# Patient Record
Sex: FEMALE | Race: ASIAN | Employment: OTHER | URBAN - METROPOLITAN AREA
[De-identification: names, ages, dates, MRNs, and addresses within clinical notes are randomized per-mention and may not be internally consistent; named-entity substitution may affect disease eponyms.]

---

## 2017-02-07 ENCOUNTER — OFFICE VISIT (OUTPATIENT)
Dept: FAMILY MEDICINE | Facility: CLINIC | Age: 27
End: 2017-02-07
Payer: COMMERCIAL

## 2017-02-07 VITALS
BODY MASS INDEX: 26.88 KG/M2 | TEMPERATURE: 98.5 F | HEIGHT: 61 IN | SYSTOLIC BLOOD PRESSURE: 100 MMHG | OXYGEN SATURATION: 99 % | HEART RATE: 76 BPM | DIASTOLIC BLOOD PRESSURE: 71 MMHG | WEIGHT: 142.4 LBS

## 2017-02-07 DIAGNOSIS — Z12.4 SCREENING FOR MALIGNANT NEOPLASM OF CERVIX: ICD-10-CM

## 2017-02-07 DIAGNOSIS — E03.9 HYPOTHYROIDISM, UNSPECIFIED TYPE: Chronic | ICD-10-CM

## 2017-02-07 DIAGNOSIS — Z00.00 PREVENTATIVE HEALTH CARE: Primary | ICD-10-CM

## 2017-02-07 DIAGNOSIS — E28.2 PCOS (POLYCYSTIC OVARIAN SYNDROME): Chronic | ICD-10-CM

## 2017-02-07 PROCEDURE — G0145 SCR C/V CYTO,THINLAYER,RESCR: HCPCS | Performed by: INTERNAL MEDICINE

## 2017-02-07 PROCEDURE — 99395 PREV VISIT EST AGE 18-39: CPT | Performed by: INTERNAL MEDICINE

## 2017-02-07 RX ORDER — DICLOFENAC SODIUM 100 MG/1
100 TABLET, FILM COATED, EXTENDED RELEASE ORAL DAILY PRN
COMMUNITY
End: 2019-04-11

## 2017-02-07 NOTE — NURSING NOTE
"Chief Complaint   Patient presents with     Physical       Initial /71 mmHg  Pulse 76  Temp(Src) 98.5  F (36.9  C) (Oral)  Ht 5' 1\" (1.549 m)  Wt 142 lb 6.4 oz (64.592 kg)  BMI 26.92 kg/m2  SpO2 99%  LMP 01/24/2017 (Exact Date) Estimated body mass index is 26.92 kg/(m^2) as calculated from the following:    Height as of this encounter: 5' 1\" (1.549 m).    Weight as of this encounter: 142 lb 6.4 oz (64.592 kg).  Medication Reconciliation: complete, left arm, regular cuff  Hannah Hannah MA  "

## 2017-02-07 NOTE — PROGRESS NOTES
SUBJECTIVE:     CC: Charlotte Antonio is an 26 year old woman who presents for preventive health visit.     Healthy Habits:    Do you get at least three servings of calcium containing foods daily (dairy, green leafy vegetables, etc.)? yes    Amount of exercise or daily activities, outside of work: walking when weather permits    Problems taking medications regularly No    Medication side effects: No    Have you had an eye exam in the past two years? Yes     Do you see a dentist twice per year? No, once every 2 years    Do you have sleep apnea, excessive snoring or daytime drowsiness? No     Infertility Clinic in [place] - plan on insemination on Friday  Already on Clomid - some hot flashes and acne but she says she is willing to work with that if it means possible pregnancy  No h/o abnormal pap smear  No breast changes  No bowel/bladder changes  Periods regular every 30 days; does take NSAID prn for painful periods with PCOS  She had fasting labs per outside source last summer as follows: Glucose 96, , HDL 37, TChol 167,     Today's PHQ-2 Score:   PHQ-2 ( 1999 [employer]) 2/7/2017 12/26/2014   Q1: Little interest or pleasure in doing things 0 0   Q2: Feeling down, depressed or hopeless 0 0   PHQ-2 Score 0 0       Abuse: Current or Past(Physical, Sexual or Emotional)- No  Do you feel safe in your environment - Yes    Social History   Substance Use Topics     Smoking status: Never Smoker      Smokeless tobacco: Not on file     Alcohol Use: No     The patient does not drink >3 drinks per day nor >7 drinks per week.    Recent Labs   Lab Test  12/19/14   0840   CHOL  169   HDL  41*   LDL  104   TRIG  121   CHOLHDLRATIO  4.1       Reviewed orders with patient.  Reviewed health maintenance and updated orders accordingly - Yes    Mammo Decision Support:  Mammogram not appropriate for this patient based on age.  Pertinent mammograms are reviewed under the imaging tab.  History of abnormal Pap smear: NO - age  "21-29 PAP every 3 years recommended.    All Histories reviewed and updated in Epic.    ROS:  Comprehensive ROS negative unless as stated above in HPI.     OBJECTIVE:     /71 mmHg  Pulse 76  Temp(Src) 98.5  F (36.9  C) (Oral)  Ht 5' 1\" (1.549 m)  Wt 142 lb 6.4 oz (64.592 kg)  BMI 26.92 kg/m2  SpO2 99%  LMP 01/24/2017 (Exact Date)  EXAM:  GENERAL: healthy, alert and no distress  EYES: Eyes grossly normal to inspection, PERRL and conjunctivae and sclerae normal  HENT: ear canals and TM's normal, nose and mouth without ulcers or lesions  NECK: no adenopathy, no asymmetry, masses, or scars and thyroid normal to palpation  RESP: lungs clear to auscultation - no rales, rhonchi or wheezes  BREAST: normal without masses, tenderness or nipple discharge and no palpable axillary masses or adenopathy  CV: regular rate and rhythm, normal S1 S2, no S3 or S4, no murmur, click or rub, no peripheral edema and peripheral pulses strong  ABDOMEN: soft, nontender, no hepatosplenomegaly, no masses and bowel sounds normal   (female): normal female external genitalia, normal urethral meatus, vaginal mucosa pink, moist, well rugated, and normal cervix/adnexa/uterus without masses or discharge  MS: no gross musculoskeletal defects noted, no edema  SKIN: no suspicious lesions or rashes  NEURO: Normal strength and tone, mentation intact and speech normal  PSYCH: mentation appears normal, affect normal/bright    ASSESSMENT/PLAN:     1. Preventative health care  Healthy gal - exercise more as weather permits  She declined flu and Tdap shots today  She good labs as noted above in HPI    2. Screening for malignant neoplasm of cervix  No h/o abnormal pap  - Pap imaged thin layer screen reflex to HPV if ASCUS - recommend age 25 - 29    3. PCOS (polycystic ovarian syndrome)  Per OBGYN/Infertility Clinic    4. Hypothyroidism, unspecified type  Increased dose from 25 mcg to 50 mcg as she tries to conceive - this again is managed by " "Infertility Clinic       COUNSELING:   Reviewed preventive health counseling, as reflected in patient instructions       Regular exercise       Healthy diet/nutrition   reports that she has never smoked. She does not have any smokeless tobacco history on file.  Estimated body mass index is 26.92 kg/(m^2) as calculated from the following:    Height as of this encounter: 5' 1\" (1.549 m).    Weight as of this encounter: 142 lb 6.4 oz (64.592 kg).   Weight management plan: Discussed healthy diet and exercise guidelines and patient will follow up in 12 months in clinic to re-evaluate.    Patient Instructions   Pap today - results will come to you on MyChart  Good luck to you!  Follow up with me as needed.        Jayshree Flowers, DO  [facility]  "

## 2017-02-07 NOTE — MR AVS SNAPSHOT
After Visit Summary   2/7/2017    Charlotte Antonio    MRN: [id number]           Patient Information     Date Of Birth          1990        Visit Information        Provider Department      2/7/2017 5:30 PM Jayshree Flowers, DO [facility]        Today's Diagnoses     Screening for malignant neoplasm of cervix    -  1       Care Instructions    Pap today - results will come to you on MyChart  Good luck to you!  Follow up with me as needed.    Preventive Health Recommendations  Female Ages 26 - 39  Yearly exam:   See your health care provider every year in order to    Review health changes.     Discuss preventive care.      Review your medicines if you your doctor has prescribed any.    Until age 30: Get a Pap test every three years (more often if you have had an abnormal result).    After age 30: Talk to your doctor about whether you should have a Pap test every 3 years or have a Pap test with HPV screening every 5 years.   You do not need a Pap test if your uterus was removed (hysterectomy) and you have not had cancer.  You should be tested each year for STDs (sexually transmitted diseases), if you're at risk.   Talk to your provider about how often to have your cholesterol checked.  If you are at risk for diabetes, you should have a diabetes test (fasting glucose).  Shots: Get a flu shot each year. Get a tetanus shot every 10 years.   Nutrition:     Eat at least 5 servings of fruits and vegetables each day.    Eat whole-grain bread, whole-wheat pasta and brown rice instead of white grains and rice.    Talk to your provider about Calcium and Vitamin D.     Lifestyle    Exercise at least 150 minutes a week (30 minutes a day, 5 days of the week). This will help you control your weight and prevent disease.    Limit alcohol to one drink per day.    No smoking.     Wear sunscreen to prevent skin cancer.    See your dentist every six months for an exam and cleaning.        Follow-ups  "after your visit        Who to contact     If you have questions or need follow up information about today's clinic visit or your schedule please contact [facility] directly at [phone].  Normal or non-critical lab and imaging results will be communicated to you by MyChart, letter or phone within 4 business days after the clinic has received the results. If you do not hear from us within 7 days, please contact the clinic through MyChart or phone. If you have a critical or abnormal lab result, we will notify you by phone as soon as possible.  Submit refill requests through [employer] or call your pharmacy and they will forward the refill request to us. Please allow 3 business days for your refill to be completed.          Additional Information About Your Visit        [employer] Information     [employer] gives you secure access to your electronic health record. If you see a primary care provider, you can also send messages to your care team and make appointments. If you have questions, please call your primary care clinic.  If you do not have a primary care provider, please call [phone] and they will assist you.        Care EveryWhere ID     This is your Care EveryWhere ID. This could be used by other organizations to access your [facility] medical records  [id number]        Your Vitals Were     Pulse Temperature Height BMI (Body Mass Index) Pulse Oximetry Last Period    76 98.5  F (36.9  C) (Oral) 5' 1\" (1.549 m) 26.92 kg/m2 99% 01/24/2017 (Exact Date)       Blood Pressure from Last 3 Encounters:   02/07/17 100/71   08/05/15 94/66   12/26/14 106/65    Weight from Last 3 Encounters:   02/07/17 142 lb 6.4 oz (64.592 kg)   08/05/15 138 lb 14.4 oz (63.005 kg)   12/26/14 141 lb 9.6 oz (64.229 kg)              We Performed the Following     Pap imaged thin layer screen reflex to HPV if ASCUS - recommend age 25 - 29          Today's Medication Changes          These changes are accurate as of: 2/7/17  6:18 PM. "  If you have any questions, ask your nurse or doctor.               These medicines have changed or have updated prescriptions.        Dose/Directions    LEVOTHYROXINE SODIUM PO   This may have changed:  Another medication with the same name was removed. Continue taking this medication, and follow the directions you see here.   Changed by:  Jayshree Flowers DO        Dose:  50 mcg   Take 50 mcg by mouth daily   Refills:  0                Primary Care Provider Office Phone # Fax #    Jayshree Flowers [phone]       [facility] [address] [place] [address] [address]  [facility] [id number]        Thank you!     Thank you for choosing [facility]  for your care. Our goal is always to provide you with excellent care. Hearing back from our patients is one way we can continue to improve our services. Please take a few minutes to complete the written survey that you may receive in the mail after your visit with us. Thank you!             Your Updated Medication List - Protect others around you: Learn how to safely use, store and throw away your medicines at www.disposemymeds.org.          This list is accurate as of: 2/7/17  6:18 PM.  Always use your most recent med list.                   Brand Name Dispense Instructions for use    ADVIL 200 MG tablet   Generic drug:  ibuprofen      Take 200 mg by mouth every 4 hours as needed for mild pain       CLOMID PO          diclofenac 100 MG 24 hr tablet    VOLTAREN XR     Take 100 mg by mouth daily as needed (Painful periods)       LEVOTHYROXINE SODIUM PO      Take 50 mcg by mouth daily       MULTIVITAMIN ADULT PO      Take by mouth daily       TYLENOL PO

## 2017-02-07 NOTE — PATIENT INSTRUCTIONS
Pap today - results will come to you on James  Good luck to you!  Follow up with me as needed.    Preventive Health Recommendations  Female Ages 26 - 39  Yearly exam:   See your health care provider every year in order to    Review health changes.     Discuss preventive care.      Review your medicines if you your doctor has prescribed any.    Until age 30: Get a Pap test every three years (more often if you have had an abnormal result).    After age 30: Talk to your doctor about whether you should have a Pap test every 3 years or have a Pap test with HPV screening every 5 years.   You do not need a Pap test if your uterus was removed (hysterectomy) and you have not had cancer.  You should be tested each year for STDs (sexually transmitted diseases), if you're at risk.   Talk to your provider about how often to have your cholesterol checked.  If you are at risk for diabetes, you should have a diabetes test (fasting glucose).  Shots: Get a flu shot each year. Get a tetanus shot every 10 years.   Nutrition:     Eat at least 5 servings of fruits and vegetables each day.    Eat whole-grain bread, whole-wheat pasta and brown rice instead of white grains and rice.    Talk to your provider about Calcium and Vitamin D.     Lifestyle    Exercise at least 150 minutes a week (30 minutes a day, 5 days of the week). This will help you control your weight and prevent disease.    Limit alcohol to one drink per day.    No smoking.     Wear sunscreen to prevent skin cancer.    See your dentist every six months for an exam and cleaning.

## 2017-02-10 LAB
COPATH REPORT: NORMAL
PAP: NORMAL

## 2017-10-16 ENCOUNTER — TELEPHONE (OUTPATIENT)
Dept: FAMILY MEDICINE | Facility: CLINIC | Age: 27
End: 2017-10-16

## 2017-10-16 DIAGNOSIS — Z13.220 LIPID SCREENING: Primary | ICD-10-CM

## 2017-10-16 NOTE — TELEPHONE ENCOUNTER
Pt requesting orders for cholesterol be placed.  Declined appt with  or nurse.  She can be reached at [phone]

## 2017-10-19 DIAGNOSIS — Z13.220 LIPID SCREENING: ICD-10-CM

## 2017-10-19 LAB
CHOLEST SERPL-MCNC: 158 MG/DL
GLUCOSE SERPL-MCNC: 89 MG/DL (ref 70–99)
HDLC SERPL-MCNC: 45 MG/DL
LDLC SERPL CALC-MCNC: 93 MG/DL
NONHDLC SERPL-MCNC: 113 MG/DL
TRIGL SERPL-MCNC: 99 MG/DL

## 2017-10-19 PROCEDURE — 80061 LIPID PANEL: CPT | Performed by: INTERNAL MEDICINE

## 2017-10-19 PROCEDURE — 36415 COLL VENOUS BLD VENIPUNCTURE: CPT | Performed by: INTERNAL MEDICINE

## 2017-10-19 PROCEDURE — 82947 ASSAY GLUCOSE BLOOD QUANT: CPT | Performed by: INTERNAL MEDICINE

## 2018-12-24 LAB
ABO + RH BLD: NORMAL
ABO + RH BLD: NORMAL
BLD GP AB SCN SERPL QL: NEGATIVE
HBV SURFACE AG SERPL QL IA: NORMAL
RUBELLA ANTIBODY IGG QUANTITATIVE: NORMAL IU/ML

## 2019-01-17 ENCOUNTER — TRANSFERRED RECORDS (OUTPATIENT)
Dept: HEALTH INFORMATION MANAGEMENT | Facility: CLINIC | Age: 29
End: 2019-01-17

## 2019-03-01 ENCOUNTER — TRANSFERRED RECORDS (OUTPATIENT)
Dept: MULTI SPECIALTY CLINIC | Facility: CLINIC | Age: 29
End: 2019-03-01

## 2019-03-01 LAB — PAP SMEAR - HIM PATIENT REPORTED: NEGATIVE

## 2019-04-11 ENCOUNTER — OFFICE VISIT (OUTPATIENT)
Dept: FAMILY MEDICINE | Facility: CLINIC | Age: 29
End: 2019-04-11
Payer: COMMERCIAL

## 2019-04-11 VITALS
DIASTOLIC BLOOD PRESSURE: 58 MMHG | HEIGHT: 61 IN | BODY MASS INDEX: 31.53 KG/M2 | HEART RATE: 84 BPM | SYSTOLIC BLOOD PRESSURE: 96 MMHG | TEMPERATURE: 98.3 F | WEIGHT: 167 LBS | OXYGEN SATURATION: 99 %

## 2019-04-11 DIAGNOSIS — Z33.1 PREGNANT STATE, INCIDENTAL: ICD-10-CM

## 2019-04-11 DIAGNOSIS — R07.89 ATYPICAL CHEST PAIN: Primary | ICD-10-CM

## 2019-04-11 PROCEDURE — 99213 OFFICE O/P EST LOW 20 MIN: CPT | Performed by: NURSE PRACTITIONER

## 2019-04-11 RX ORDER — LEVOTHYROXINE SODIUM 25 UG/1
25 TABLET ORAL DAILY
Refills: 3 | COMMUNITY
Start: 2019-02-15 | End: 2019-08-24

## 2019-04-11 ASSESSMENT — MIFFLIN-ST. JEOR: SCORE: 1419.89

## 2019-04-11 NOTE — PROGRESS NOTES
HPI      SUBJECTIVE:   Charlotte Antonio is a 28 year old female who presents to clinic today for the following   health issues:      Chief Complaint   Patient presents with     Musculoskeletal Problem     right arm, right side of chest since this morning       Is 23 weeks pregnant with her first   This morning noticed a pain of right chest and right hand. It was there for 30 mins and now is a lot better   It happened right after getting out of bed. Was sleeping on right side last night but did flip to left   Last night had deep fried spicy food and had bloating from this.   Had hot water with honey and lemon and also almonds and symptoms improved after this.   Massaging the arm and raising the arm was comforting   No recent URI   No cough, SOB,  Has had similar CP with bloating in the past   Has had more burping with the pregnancy   No recent injury   No rashes   No breast pain   No neck pain   No pleuritic pain   No abd pain or bleeding    Past Medical History:   Diagnosis Date     Chondromalacia patellae of right knee      Hypothyroidism      PCOS (polycystic ovarian syndrome)      Family History   Problem Relation Age of Onset     C.A.D. No family hx of      Diabetes Mother      Cancer No family hx of      Past Surgical History:   Procedure Laterality Date     NO HISTORY OF SURGERY       Social History     Tobacco Use     Smoking status: Never Smoker     Smokeless tobacco: Never Used   Substance Use Topics     Alcohol use: No     Alcohol/week: 0.0 oz     Current Outpatient Medications   Medication Sig Dispense Refill     levothyroxine (SYNTHROID/LEVOTHROID) 25 MCG tablet Take 25 mcg by mouth daily  3     Multiple Vitamins-Minerals (MULTIVITAMIN ADULT PO) Take by mouth daily       Allergies   Allergen Reactions     Sulfamethoxazole Rash       Reviewed and updated as needed this visit by clinical staff and provider       ROS  Detailed as above       BP 96/58 (BP Location: Left arm, Patient Position: Sitting,  "Cuff Size: Adult Regular)   Pulse 84   Temp 98.3  F (36.8  C) (Oral)   Ht 1.549 m (5' 1\")   Wt 75.8 kg (167 lb)   LMP 10/23/2018 (Exact Date)   SpO2 99%   Breastfeeding? No   BMI 31.55 kg/m     Physical Exam   Constitutional: She appears well-developed.   HENT:   Head: Normocephalic.   Eyes: Conjunctivae are normal.   Cardiovascular: Normal rate, regular rhythm, normal heart sounds and intact distal pulses.   No murmur heard.  Pulmonary/Chest: Effort normal and breath sounds normal. No respiratory distress. She exhibits no tenderness.   Abdominal:   Gravid abdomen   Musculoskeletal: Normal range of motion. She exhibits no edema.   Neurological: She is alert.   Skin: Skin is warm and dry.   Psychiatric: She has a normal mood and affect. Judgment normal.       Assessment and Plan:       ICD-10-CM    1. Atypical chest pain R07.89    2. Pregnant state, incidental Z33.1      Suspect pain is muscular vs indigestion. No red flags today. Will continue to watch and wait. She has OB appt tomorrow and can discuss at that time.       Guillermo Boucher, APRN, CNP  [facility]    "

## 2019-04-12 LAB — TREPONEMA ANTIBODIES: NORMAL

## 2019-07-03 LAB — GROUP B STREP PCR: NEGATIVE

## 2019-07-18 ENCOUNTER — ANESTHESIA (OUTPATIENT)
Dept: SURGERY | Facility: CLINIC | Age: 29
End: 2019-07-18
Payer: COMMERCIAL

## 2019-07-18 ENCOUNTER — HOSPITAL ENCOUNTER (INPATIENT)
Facility: CLINIC | Age: 29
LOS: 4 days | Discharge: HOME | End: 2019-07-22
Attending: OBSTETRICS & GYNECOLOGY | Admitting: OBSTETRICS & GYNECOLOGY
Payer: COMMERCIAL

## 2019-07-18 ENCOUNTER — ANESTHESIA EVENT (OUTPATIENT)
Dept: SURGERY | Facility: CLINIC | Age: 29
End: 2019-07-18
Payer: COMMERCIAL

## 2019-07-18 DIAGNOSIS — Z98.891 S/P PRIMARY LOW TRANSVERSE C-SECTION: Primary | ICD-10-CM

## 2019-07-18 LAB
ABO + RH BLD: NORMAL
ABO + RH BLD: NORMAL
BLD GP AB SCN SERPL QL: NORMAL
BLD PROD TYP BPU: NORMAL
BLD UNIT ID BPU: 0
BLD UNIT ID BPU: 0
BLOOD BANK CMNT PATIENT-IMP: NORMAL
BLOOD PRODUCT CODE: NORMAL
BLOOD PRODUCT CODE: NORMAL
BPU ID: NORMAL
BPU ID: NORMAL
HGB BLD-MCNC: 13.4 G/DL (ref 11.7–15.7)
NUM BPU REQUESTED: 2
SPECIMEN EXP DATE BLD: NORMAL
TRANSFUSION STATUS PATIENT QL: NORMAL

## 2019-07-18 PROCEDURE — 37000008 ZZH ANESTHESIA TECHNICAL FEE, 1ST 30 MIN: Performed by: OBSTETRICS & GYNECOLOGY

## 2019-07-18 PROCEDURE — 86850 RBC ANTIBODY SCREEN: CPT | Performed by: OBSTETRICS & GYNECOLOGY

## 2019-07-18 PROCEDURE — 25000132 ZZH RX MED GY IP 250 OP 250 PS 637

## 2019-07-18 PROCEDURE — 25000128 H RX IP 250 OP 636

## 2019-07-18 PROCEDURE — 25000128 H RX IP 250 OP 636: Performed by: OBSTETRICS & GYNECOLOGY

## 2019-07-18 PROCEDURE — 25000132 ZZH RX MED GY IP 250 OP 250 PS 637: Performed by: OBSTETRICS & GYNECOLOGY

## 2019-07-18 PROCEDURE — 36000056 ZZH SURGERY LEVEL 3 1ST 30 MIN: Performed by: OBSTETRICS & GYNECOLOGY

## 2019-07-18 PROCEDURE — 25800030 ZZH RX IP 258 OP 636: Performed by: NURSE ANESTHETIST, CERTIFIED REGISTERED

## 2019-07-18 PROCEDURE — 86901 BLOOD TYPING SEROLOGIC RH(D): CPT | Performed by: OBSTETRICS & GYNECOLOGY

## 2019-07-18 PROCEDURE — 36000058 ZZH SURGERY LEVEL 3 EA 15 ADDTL MIN: Performed by: OBSTETRICS & GYNECOLOGY

## 2019-07-18 PROCEDURE — 85018 HEMOGLOBIN: CPT | Performed by: OBSTETRICS & GYNECOLOGY

## 2019-07-18 PROCEDURE — 25800030 ZZH RX IP 258 OP 636: Performed by: OBSTETRICS & GYNECOLOGY

## 2019-07-18 PROCEDURE — 86891 AUTOLOGOUS BLOOD OP SALVAGE: CPT | Performed by: OBSTETRICS & GYNECOLOGY

## 2019-07-18 PROCEDURE — 86923 COMPATIBILITY TEST ELECTRIC: CPT | Performed by: OBSTETRICS & GYNECOLOGY

## 2019-07-18 PROCEDURE — 37000009 ZZH ANESTHESIA TECHNICAL FEE, EACH ADDTL 15 MIN: Performed by: OBSTETRICS & GYNECOLOGY

## 2019-07-18 PROCEDURE — 27211220 ZZ H OR ASSEMBLY BASIC A&A LINE 00208-00: Performed by: OBSTETRICS & GYNECOLOGY

## 2019-07-18 PROCEDURE — 86900 BLOOD TYPING SEROLOGIC ABO: CPT | Performed by: OBSTETRICS & GYNECOLOGY

## 2019-07-18 PROCEDURE — 12000035 ZZH R&B POSTPARTUM

## 2019-07-18 PROCEDURE — 71000012 ZZH RECOVERY PHASE 1 LEVEL 1 FIRST HR: Performed by: OBSTETRICS & GYNECOLOGY

## 2019-07-18 PROCEDURE — 86780 TREPONEMA PALLIDUM: CPT | Performed by: OBSTETRICS & GYNECOLOGY

## 2019-07-18 PROCEDURE — 25000125 ZZHC RX 250: Performed by: NURSE ANESTHETIST, CERTIFIED REGISTERED

## 2019-07-18 PROCEDURE — 25000128 H RX IP 250 OP 636: Performed by: NURSE ANESTHETIST, CERTIFIED REGISTERED

## 2019-07-18 PROCEDURE — 71000013 ZZH RECOVERY PHASE 1 LEVEL 1 EA ADDTL HR: Performed by: OBSTETRICS & GYNECOLOGY

## 2019-07-18 PROCEDURE — 27210794 ZZH OR GENERAL SUPPLY STERILE: Performed by: OBSTETRICS & GYNECOLOGY

## 2019-07-18 PROCEDURE — 40000169 ZZH STATISTIC PRE-PROCEDURE ASSESSMENT I: Performed by: OBSTETRICS & GYNECOLOGY

## 2019-07-18 PROCEDURE — 36415 COLL VENOUS BLD VENIPUNCTURE: CPT | Performed by: OBSTETRICS & GYNECOLOGY

## 2019-07-18 PROCEDURE — P9016 RBC LEUKOCYTES REDUCED: HCPCS | Performed by: OBSTETRICS & GYNECOLOGY

## 2019-07-18 PROCEDURE — 27211219 ZZ H OR RESEVOIR 3L 150 MICRON FILTER CELLSAVER HARDSHELL 00205-00: Performed by: OBSTETRICS & GYNECOLOGY

## 2019-07-18 RX ORDER — ONDANSETRON 2 MG/ML
4 INJECTION INTRAMUSCULAR; INTRAVENOUS EVERY 6 HOURS PRN
Status: DISCONTINUED | OUTPATIENT
Start: 2019-07-18 | End: 2019-07-18

## 2019-07-18 RX ORDER — OXYTOCIN/0.9 % SODIUM CHLORIDE 30/500 ML
340 PLASTIC BAG, INJECTION (ML) INTRAVENOUS CONTINUOUS PRN
Status: DISCONTINUED | OUTPATIENT
Start: 2019-07-18 | End: 2019-07-22 | Stop reason: HOSPADM

## 2019-07-18 RX ORDER — CITRIC ACID/SODIUM CITRATE 334-500MG
30 SOLUTION, ORAL ORAL
Status: COMPLETED | OUTPATIENT
Start: 2019-07-18 | End: 2019-07-18

## 2019-07-18 RX ORDER — HYDROMORPHONE HYDROCHLORIDE 1 MG/ML
.3-.5 INJECTION, SOLUTION INTRAMUSCULAR; INTRAVENOUS; SUBCUTANEOUS EVERY 30 MIN PRN
Status: DISCONTINUED | OUTPATIENT
Start: 2019-07-18 | End: 2019-07-22 | Stop reason: HOSPADM

## 2019-07-18 RX ORDER — MORPHINE SULFATE 1 MG/ML
INJECTION, SOLUTION EPIDURAL; INTRATHECAL; INTRAVENOUS PRN
Status: DISCONTINUED | OUTPATIENT
Start: 2019-07-18 | End: 2019-07-18

## 2019-07-18 RX ORDER — CEFAZOLIN SODIUM 1 G/3ML
1 INJECTION, POWDER, FOR SOLUTION INTRAMUSCULAR; INTRAVENOUS SEE ADMIN INSTRUCTIONS
Status: DISCONTINUED | OUTPATIENT
Start: 2019-07-18 | End: 2019-07-18 | Stop reason: HOSPADM

## 2019-07-18 RX ORDER — SODIUM CHLORIDE, SODIUM LACTATE, POTASSIUM CHLORIDE, CALCIUM CHLORIDE 600; 310; 30; 20 MG/100ML; MG/100ML; MG/100ML; MG/100ML
INJECTION, SOLUTION INTRAVENOUS CONTINUOUS
Status: DISCONTINUED | OUTPATIENT
Start: 2019-07-18 | End: 2019-07-18 | Stop reason: HOSPADM

## 2019-07-18 RX ORDER — ACETAMINOPHEN 325 MG/1
TABLET ORAL
Status: COMPLETED
Start: 2019-07-18 | End: 2019-07-18

## 2019-07-18 RX ORDER — CEFAZOLIN SODIUM 2 G/100ML
INJECTION, SOLUTION INTRAVENOUS
Status: DISCONTINUED
Start: 2019-07-18 | End: 2019-07-18 | Stop reason: HOSPADM

## 2019-07-18 RX ORDER — BISACODYL 10 MG
10 SUPPOSITORY, RECTAL RECTAL DAILY PRN
Status: DISCONTINUED | OUTPATIENT
Start: 2019-07-20 | End: 2019-07-22 | Stop reason: HOSPADM

## 2019-07-18 RX ORDER — LIDOCAINE 40 MG/G
CREAM TOPICAL
Status: DISCONTINUED | OUTPATIENT
Start: 2019-07-18 | End: 2019-07-22 | Stop reason: HOSPADM

## 2019-07-18 RX ORDER — CITRIC ACID/SODIUM CITRATE 334-500MG
SOLUTION, ORAL ORAL
Status: COMPLETED
Start: 2019-07-18 | End: 2019-07-18

## 2019-07-18 RX ORDER — ACETAMINOPHEN 325 MG/1
975 TABLET ORAL EVERY 8 HOURS
Status: COMPLETED | OUTPATIENT
Start: 2019-07-18 | End: 2019-07-21

## 2019-07-18 RX ORDER — OXYTOCIN/0.9 % SODIUM CHLORIDE 30/500 ML
PLASTIC BAG, INJECTION (ML) INTRAVENOUS CONTINUOUS PRN
Status: DISCONTINUED | OUTPATIENT
Start: 2019-07-18 | End: 2019-07-18

## 2019-07-18 RX ORDER — ACETAMINOPHEN 325 MG/1
650 TABLET ORAL EVERY 4 HOURS PRN
Status: DISCONTINUED | OUTPATIENT
Start: 2019-07-21 | End: 2019-07-22 | Stop reason: HOSPADM

## 2019-07-18 RX ORDER — CEFAZOLIN SODIUM 2 G/100ML
INJECTION, SOLUTION INTRAVENOUS PRN
Status: DISCONTINUED | OUTPATIENT
Start: 2019-07-18 | End: 2019-07-18

## 2019-07-18 RX ORDER — CHOLECALCIFEROL (VITAMIN D3) 50 MCG
1 TABLET ORAL DAILY
COMMUNITY

## 2019-07-18 RX ORDER — BUPIVACAINE HYDROCHLORIDE 7.5 MG/ML
INJECTION, SOLUTION INTRASPINAL PRN
Status: DISCONTINUED | OUTPATIENT
Start: 2019-07-18 | End: 2019-07-18

## 2019-07-18 RX ORDER — HYDROCORTISONE 2.5 %
CREAM (GRAM) TOPICAL 3 TIMES DAILY PRN
Status: DISCONTINUED | OUTPATIENT
Start: 2019-07-18 | End: 2019-07-22 | Stop reason: HOSPADM

## 2019-07-18 RX ORDER — OXYTOCIN/0.9 % SODIUM CHLORIDE 30/500 ML
PLASTIC BAG, INJECTION (ML) INTRAVENOUS
Status: DISCONTINUED
Start: 2019-07-18 | End: 2019-07-18 | Stop reason: HOSPADM

## 2019-07-18 RX ORDER — IBUPROFEN 400 MG/1
800 TABLET, FILM COATED ORAL EVERY 6 HOURS PRN
Status: DISCONTINUED | OUTPATIENT
Start: 2019-07-18 | End: 2019-07-22 | Stop reason: HOSPADM

## 2019-07-18 RX ORDER — CEFAZOLIN SODIUM 2 G/100ML
2 INJECTION, SOLUTION INTRAVENOUS
Status: DISCONTINUED | OUTPATIENT
Start: 2019-07-18 | End: 2019-07-18 | Stop reason: HOSPADM

## 2019-07-18 RX ORDER — AMOXICILLIN 250 MG
1 CAPSULE ORAL 2 TIMES DAILY PRN
Status: DISCONTINUED | OUTPATIENT
Start: 2019-07-18 | End: 2019-07-22 | Stop reason: HOSPADM

## 2019-07-18 RX ORDER — LANOLIN 100 %
OINTMENT (GRAM) TOPICAL
Status: DISCONTINUED | OUTPATIENT
Start: 2019-07-18 | End: 2019-07-22 | Stop reason: HOSPADM

## 2019-07-18 RX ORDER — LEVOTHYROXINE SODIUM 25 UG/1
25 TABLET ORAL DAILY
Status: DISCONTINUED | OUTPATIENT
Start: 2019-07-18 | End: 2019-07-22 | Stop reason: HOSPADM

## 2019-07-18 RX ORDER — EPHEDRINE SULFATE 50 MG/ML
5 INJECTION, SOLUTION INTRAMUSCULAR; INTRAVENOUS; SUBCUTANEOUS
Status: DISCONTINUED | OUTPATIENT
Start: 2019-07-18 | End: 2019-07-22 | Stop reason: HOSPADM

## 2019-07-18 RX ORDER — ONDANSETRON 2 MG/ML
INJECTION INTRAMUSCULAR; INTRAVENOUS PRN
Status: DISCONTINUED | OUTPATIENT
Start: 2019-07-18 | End: 2019-07-18

## 2019-07-18 RX ORDER — LIDOCAINE 40 MG/G
CREAM TOPICAL
Status: DISCONTINUED | OUTPATIENT
Start: 2019-07-18 | End: 2019-07-18

## 2019-07-18 RX ORDER — OXYTOCIN/0.9 % SODIUM CHLORIDE 30/500 ML
100 PLASTIC BAG, INJECTION (ML) INTRAVENOUS CONTINUOUS
Status: DISCONTINUED | OUTPATIENT
Start: 2019-07-18 | End: 2019-07-22 | Stop reason: HOSPADM

## 2019-07-18 RX ORDER — DEXTROSE, SODIUM CHLORIDE, SODIUM LACTATE, POTASSIUM CHLORIDE, AND CALCIUM CHLORIDE 5; .6; .31; .03; .02 G/100ML; G/100ML; G/100ML; G/100ML; G/100ML
INJECTION, SOLUTION INTRAVENOUS CONTINUOUS
Status: DISCONTINUED | OUTPATIENT
Start: 2019-07-18 | End: 2019-07-22 | Stop reason: HOSPADM

## 2019-07-18 RX ORDER — SODIUM CHLORIDE, SODIUM LACTATE, POTASSIUM CHLORIDE, CALCIUM CHLORIDE 600; 310; 30; 20 MG/100ML; MG/100ML; MG/100ML; MG/100ML
INJECTION, SOLUTION INTRAVENOUS CONTINUOUS PRN
Status: DISCONTINUED | OUTPATIENT
Start: 2019-07-18 | End: 2019-07-18

## 2019-07-18 RX ORDER — ONDANSETRON 2 MG/ML
4 INJECTION INTRAMUSCULAR; INTRAVENOUS EVERY 6 HOURS PRN
Status: DISCONTINUED | OUTPATIENT
Start: 2019-07-18 | End: 2019-07-22 | Stop reason: HOSPADM

## 2019-07-18 RX ORDER — OXYCODONE HYDROCHLORIDE 5 MG/1
5-10 TABLET ORAL
Status: DISCONTINUED | OUTPATIENT
Start: 2019-07-18 | End: 2019-07-22 | Stop reason: HOSPADM

## 2019-07-18 RX ORDER — KETOROLAC TROMETHAMINE 30 MG/ML
INJECTION, SOLUTION INTRAMUSCULAR; INTRAVENOUS
Status: COMPLETED
Start: 2019-07-18 | End: 2019-07-18

## 2019-07-18 RX ORDER — CITRIC ACID/SODIUM CITRATE 334-500MG
30 SOLUTION, ORAL ORAL
Status: DISCONTINUED | OUTPATIENT
Start: 2019-07-18 | End: 2019-07-18 | Stop reason: HOSPADM

## 2019-07-18 RX ORDER — NALOXONE HYDROCHLORIDE 0.4 MG/ML
.1-.4 INJECTION, SOLUTION INTRAMUSCULAR; INTRAVENOUS; SUBCUTANEOUS
Status: DISCONTINUED | OUTPATIENT
Start: 2019-07-18 | End: 2019-07-18

## 2019-07-18 RX ORDER — TRANEXAMIC ACID 100 MG/ML
INJECTION, SOLUTION INTRAVENOUS
Status: DISCONTINUED
Start: 2019-07-18 | End: 2019-07-18 | Stop reason: WASHOUT

## 2019-07-18 RX ORDER — OXYTOCIN 10 [USP'U]/ML
10 INJECTION, SOLUTION INTRAMUSCULAR; INTRAVENOUS
Status: DISCONTINUED | OUTPATIENT
Start: 2019-07-18 | End: 2019-07-22 | Stop reason: HOSPADM

## 2019-07-18 RX ORDER — SIMETHICONE 80 MG
80 TABLET,CHEWABLE ORAL 4 TIMES DAILY PRN
Status: DISCONTINUED | OUTPATIENT
Start: 2019-07-18 | End: 2019-07-22 | Stop reason: HOSPADM

## 2019-07-18 RX ORDER — PHENYLEPHRINE HCL IN 0.9% NACL 1 MG/10 ML
SYRINGE (ML) INTRAVENOUS CONTINUOUS PRN
Status: DISCONTINUED | OUTPATIENT
Start: 2019-07-18 | End: 2019-07-18

## 2019-07-18 RX ORDER — NALOXONE HYDROCHLORIDE 0.4 MG/ML
.1-.4 INJECTION, SOLUTION INTRAMUSCULAR; INTRAVENOUS; SUBCUTANEOUS
Status: DISCONTINUED | OUTPATIENT
Start: 2019-07-18 | End: 2019-07-22 | Stop reason: HOSPADM

## 2019-07-18 RX ORDER — ONDANSETRON 4 MG/1
4 TABLET, ORALLY DISINTEGRATING ORAL EVERY 6 HOURS PRN
Status: DISCONTINUED | OUTPATIENT
Start: 2019-07-18 | End: 2019-07-18

## 2019-07-18 RX ORDER — KETOROLAC TROMETHAMINE 30 MG/ML
30 INJECTION, SOLUTION INTRAMUSCULAR; INTRAVENOUS EVERY 6 HOURS
Status: COMPLETED | OUTPATIENT
Start: 2019-07-18 | End: 2019-07-19

## 2019-07-18 RX ORDER — AMOXICILLIN 250 MG
2 CAPSULE ORAL 2 TIMES DAILY PRN
Status: DISCONTINUED | OUTPATIENT
Start: 2019-07-18 | End: 2019-07-22 | Stop reason: HOSPADM

## 2019-07-18 RX ORDER — NALBUPHINE HYDROCHLORIDE 10 MG/ML
2.5-5 INJECTION, SOLUTION INTRAMUSCULAR; INTRAVENOUS; SUBCUTANEOUS EVERY 6 HOURS PRN
Status: DISCONTINUED | OUTPATIENT
Start: 2019-07-18 | End: 2019-07-22 | Stop reason: HOSPADM

## 2019-07-18 RX ADMIN — CEFAZOLIN SODIUM 2 G: 2 INJECTION, SOLUTION INTRAVENOUS at 13:36

## 2019-07-18 RX ADMIN — Medication 10 MCG/MIN: at 13:23

## 2019-07-18 RX ADMIN — SODIUM CITRATE AND CITRIC ACID MONOHYDRATE 30 ML: 500; 334 SOLUTION ORAL at 13:05

## 2019-07-18 RX ADMIN — SODIUM CHLORIDE, POTASSIUM CHLORIDE, SODIUM LACTATE AND CALCIUM CHLORIDE: 600; 310; 30; 20 INJECTION, SOLUTION INTRAVENOUS at 13:43

## 2019-07-18 RX ADMIN — KETOROLAC TROMETHAMINE 30 MG: 30 INJECTION, SOLUTION INTRAMUSCULAR at 14:50

## 2019-07-18 RX ADMIN — SODIUM CHLORIDE, SODIUM LACTATE, POTASSIUM CHLORIDE, CALCIUM CHLORIDE AND DEXTROSE MONOHYDRATE: 5; 600; 310; 30; 20 INJECTION, SOLUTION INTRAVENOUS at 23:42

## 2019-07-18 RX ADMIN — ACETAMINOPHEN 975 MG: 325 TABLET, FILM COATED ORAL at 14:50

## 2019-07-18 RX ADMIN — SODIUM CHLORIDE, POTASSIUM CHLORIDE, SODIUM LACTATE AND CALCIUM CHLORIDE: 600; 310; 30; 20 INJECTION, SOLUTION INTRAVENOUS at 12:06

## 2019-07-18 RX ADMIN — SODIUM CHLORIDE, POTASSIUM CHLORIDE, SODIUM LACTATE AND CALCIUM CHLORIDE: 600; 310; 30; 20 INJECTION, SOLUTION INTRAVENOUS at 13:17

## 2019-07-18 RX ADMIN — KETOROLAC TROMETHAMINE 30 MG: 30 INJECTION, SOLUTION INTRAMUSCULAR at 20:46

## 2019-07-18 RX ADMIN — OXYTOCIN 100 ML/HR: 10 INJECTION INTRAVENOUS at 18:14

## 2019-07-18 RX ADMIN — Medication 0.1 MG: at 13:23

## 2019-07-18 RX ADMIN — SODIUM CHLORIDE, SODIUM LACTATE, POTASSIUM CHLORIDE, CALCIUM CHLORIDE: 600; 310; 30; 20 INJECTION, SOLUTION INTRAVENOUS at 13:39

## 2019-07-18 RX ADMIN — BUPIVACAINE HYDROCHLORIDE IN DEXTROSE 12 MG: 7.5 INJECTION, SOLUTION SUBARACHNOID at 13:23

## 2019-07-18 RX ADMIN — ACETAMINOPHEN 975 MG: 325 TABLET, FILM COATED ORAL at 22:26

## 2019-07-18 RX ADMIN — SENNOSIDES AND DOCUSATE SODIUM 1 TABLET: 8.6; 5 TABLET ORAL at 20:46

## 2019-07-18 RX ADMIN — SODIUM CHLORIDE, POTASSIUM CHLORIDE, SODIUM LACTATE AND CALCIUM CHLORIDE 1000 ML: 600; 310; 30; 20 INJECTION, SOLUTION INTRAVENOUS at 11:29

## 2019-07-18 RX ADMIN — Medication 340 ML/HR: at 13:42

## 2019-07-18 RX ADMIN — Medication 30 ML: at 13:05

## 2019-07-18 RX ADMIN — ONDANSETRON 4 MG: 2 INJECTION INTRAMUSCULAR; INTRAVENOUS at 13:27

## 2019-07-18 ASSESSMENT — LIFESTYLE VARIABLES: TOBACCO_USE: 0

## 2019-07-18 ASSESSMENT — MIFFLIN-ST. JEOR: SCORE: 1477.38

## 2019-07-18 NOTE — ANESTHESIA PROCEDURE NOTES
Peripheral nerve/Neuraxial procedure note : intrathecal  Pre-Procedure  Performed by Aris Mascorro MD  Location: OR      Pre-Anesthestic Checklist: patient identified, IV checked, risks and benefits discussed, informed consent, monitors and equipment checked and pre-op evaluation    Timeout  Correct Patient: Yes   Correct Procedure: Yes   Correct Site: Yes   Correct Laterality: N/A   Correct Position: Yes   Site Marked: N/A   .   Procedure Documentation    .    Procedure:    Intrathecal.  Insertion Site:L3-4  (midline approach)      Patient Prep;povidone-iodine 7.5% surgical scrub.  .  Needle: Lachelle tip Spinal Needle (gauge): 25  Spinal/LP Needle Length (inches): 3.5 # of attempts: 1 and # of redirects:  .       Assessment/Narrative  Paresthesias: No.  .  .  clear CSF fluid removed . Comments:  Subarachnoid Block  1.6 ml 0.75% bupivacaine with dextrose and 100 mcg MSO4

## 2019-07-18 NOTE — H&P
28 yo  at 38+3 days gestation who presents for a scheduled primary  due to a posterior marginal placenta previa.    PMH Ill Hypothyroid   Surg None   Meds Levothyroxine 25mcg    PNV   All Sulfa    OBHx As above     SHx    Non-smoker   No alcohol or other drug use    ROS Negative    PE AFVSS   Gen Well appearing   Cardio RRR   Resp CTA   Abd Fundus nontender   Ext Trace edema    Plan for primary  due to marginal placenta previa   1)2u PRBCs available for immediate use if necessary   2)Will manage any excess bleeding with Pitocin then TXA then Methergine then Cytotec in addition to any necessary procedural intervention such as the Bakri balloon. Patient is aware of the rare but important use of hysterectomy as a life saving measure in certain cases.

## 2019-07-18 NOTE — PLAN OF CARE
Data: Charlotte Antonio transferred to 402 via bed at 1625. Baby transferred via parent's arms.  Action: Receiving unit notified of transfer: Yes. Patient and family notified of room change. Report given to Blossom GUEVARA RN at. Belongings sent to receiving unit. Accompanied by Registered Nurse. Oriented patient to surroundings. Call light within reach. ID bands double-checked with receiving RN.  Response: Patient tolerated transfer and is stable.

## 2019-07-18 NOTE — BRIEF OP NOTE
[facility] Brief Operative Note    Pre-operative diagnosis: PLACENTA PREVIA   Post-operative diagnosis Posterior marginal placenta previa     Procedure: Procedure(s):  PRIMARY  SECTION   Surgeon(s): Surgeon(s) and Role:     * Reyes Chacko MD - Primary     * Suad Villagomez MD - Assisting   Estimated blood loss: * No values recorded between 2019  1:36 PM and 2019  2:08 PM *    Specimens: ID Type Source Tests Collected by Time Destination   1 :  Cord blood Blood OR DOCUMENTATION ONLY Reyes Chacko MD 2019  1:42 PM    2 :  Placenta Placenta SPECIMEN DISCARDED Reyes Chacko MD 2019  1:42 PM    3 :  Tissue Umbilical Cord OR DOCUMENTATION ONLY Reyes Chacko MD 2019  1:42 PM       Findings: 7lb 1oz male infant. Posterior marginal placenta previa with no excessive bleeding.

## 2019-07-18 NOTE — ANESTHESIA CARE TRANSFER NOTE
Patient: Charoltte Antonio    Procedure(s):  PRIMARY  SECTION    Diagnosis: PLACENTA PREVIA  Diagnosis Additional Information: No value filed.    Anesthesia Type:   Spinal     Note:  Airway :Room Air  Patient transferred to:PACU  Handoff Report: Identifed the Patient, Identified the Reponsible Provider, Reviewed the pertinent medical history, Discussed the surgical course, Reviewed Intra-OP anesthesia mangement and issues during anesthesia, Set expectations for post-procedure period and Allowed opportunity for questions and acknowledgement of understanding      Vitals: (Last set prior to Anesthesia Care Transfer)    CRNA VITALS  2019 1339 - 2019 1421      2019             Pulse:  97    SpO2:  100 %    Resp Rate (set):  10                Electronically Signed By: NIRU Vega CRNA  2019  2:21 PM   solids

## 2019-07-18 NOTE — PLAN OF CARE
At 1630, Pt. admitted from L&D via bed.. Pt. arrived with baby and was accompanied by , nurse, and arrived with personal belongings. Report was taken from Pili Velasco RN in L&D.  Pt. is A&Ox3 and VSS on RA. Fundus is firm and midline.  Vaginal bleeding is small.   Pt. c/o 0/10 pain. Pt. denied feeling nausea, CP, SOB, lightheadedness, or dizziness. LS clear and BS active. PIV patent and infusing.  Laurent patent and draining.  Dressing to lower abdomen CDI.  Pneumoboots in place to BLE.  Pt. oriented to the room and call light system.

## 2019-07-18 NOTE — ANESTHESIA PREPROCEDURE EVALUATION
Procedure: Procedure(s):  PRIMARY  SECTION  Preop diagnosis: PLACENTA PREVIA    Allergies   Allergen Reactions     Sulfamethoxazole Rash     Past Medical History:   Diagnosis Date     Chondromalacia patellae of right knee      Hypothyroidism     Levothyroxine 25mg     PCOS (polycystic ovarian syndrome)      Past Surgical History:   Procedure Laterality Date     NO HISTORY OF SURGERY       Social History     Tobacco Use     Smoking status: Never Smoker     Smokeless tobacco: Never Used   Substance Use Topics     Alcohol use: No     Alcohol/week: 0.0 oz     Prior to Admission medications    Medication Sig Start Date End Date Taking? Authorizing Provider   levothyroxine (SYNTHROID/LEVOTHROID) 25 MCG tablet Take 25 mcg by mouth daily 2/15/19  Yes Reported, Patient   Multiple Vitamins-Minerals (MULTIVITAMIN ADULT PO) Take by mouth daily   Yes Reported, Patient   vitamin D3 (CHOLECALCIFEROL) 2000 units (50 mcg) tablet Take 1 tablet by mouth daily   Yes Reported, Patient     No current Epic-ordered facility-administered medications on file.      No current [facility]-ordered outpatient medications on file.       Wt Readings from Last 1 Encounters:   19 82 kg (180 lb 12.4 oz)     Temp Readings from Last 1 Encounters:   19 37.3  C (99.1  F) (Temporal)     BP Readings from Last 6 Encounters:   19 109/65   19 96/58   17 100/71   08/05/15 94/66   14 106/65     Pulse Readings from Last 4 Encounters:   19 92   19 84   17 76   08/05/15 70     Resp Readings from Last 1 Encounters:   19 18     SpO2 Readings from Last 1 Encounters:   19 99%     Recent Labs   Lab Test 10/19/17  0835 14  1658   NA  --  139   POTASSIUM  --  4.8   CHLORIDE  --  106   CO2  --  29   ANIONGAP  --  4   GLC 89 83   BUN  --  9   CR  --  0.56   DONNA  --  9.1     No results for input(s): AST, ALT, ALKPHOS, BILITOTAL, LIPASE in the last 29412 hours.  Recent Labs   Lab Test 19  1134    HGB 13.4     Recent Labs   Lab Test 19  1134   ABO O   RH Pos     No results for input(s): INR, PTT in the last 30737 hours.   No results for input(s): TROPI in the last 89023 hours.  No results for input(s): PH, PCO2, PO2, HCO3 in the last 48417 hours.  No results for input(s): HCG in the last 44281 hours.  No results found for this or any previous visit (from the past 744 hour(s)).    RECENT LABS:   ECG:   ECHO:   Anesthesia Pre-Procedure Evaluation    Patient: Charlotte Antonio   MRN: [id number] : 1990          Preoperative Diagnosis: PLACENTA PREVIA    Procedure(s):  PRIMARY  SECTION    Past Medical History:   Diagnosis Date     Chondromalacia patellae of right knee      Hypothyroidism     Levothyroxine 25mg     PCOS (polycystic ovarian syndrome)      Past Surgical History:   Procedure Laterality Date     NO HISTORY OF SURGERY         Anesthesia Evaluation     . Pt has not had prior anesthetic            ROS/MED HX    ENT/Pulmonary:      (-) tobacco use   Neurologic:       Cardiovascular:         METS/Exercise Tolerance:     Hematologic:         Musculoskeletal:         GI/Hepatic:        (-) GERD   Renal/Genitourinary:         Endo:     (+) thyroid problem hypothyroidism, .      Psychiatric:         Infectious Disease:         Malignancy:         Other: Comment: Placenta previa   (+) Possibly pregnant                         Physical Exam  Normal systems: cardiovascular, pulmonary and dental    Airway   Mallampati: I  Neck ROM: full    Dental     Cardiovascular       Pulmonary             Lab Results   Component Value Date    HGB 13.4 2019     2014    POTASSIUM 4.8 2014    CHLORIDE 106 2014    CO2 29 2014    BUN 9 2014    CR 0.56 2014    GLC 89 10/19/2017    DONNA 9.1 2014    TSH 3.233 10/30/2015       Preop Vitals  BP Readings from Last 3 Encounters:   19 109/65   19 96/58   17 100/71    Pulse Readings from Last 3  "Encounters:   07/18/19 92   04/11/19 84   02/07/17 76      Resp Readings from Last 3 Encounters:   07/18/19 18   12/26/14 16    SpO2 Readings from Last 3 Encounters:   04/11/19 99%   02/07/17 99%   08/05/15 100%      Temp Readings from Last 1 Encounters:   07/18/19 37.3  C (99.1  F) (Temporal)    Ht Readings from Last 1 Encounters:   07/18/19 1.549 m (5' 1\")      Wt Readings from Last 1 Encounters:   07/18/19 82 kg (180 lb 12.4 oz)    Estimated body mass index is 34.16 kg/m  as calculated from the following:    Height as of this encounter: 1.549 m (5' 1\").    Weight as of this encounter: 82 kg (180 lb 12.4 oz).       Anesthesia Plan      History & Physical Review  History and physical reviewed and following examination; no interval change.    ASA Status:  2 .    NPO Status:  > 8 hours    Plan for Spinal   PONV prophylaxis:  Ondansetron (or other 5HT-3)       Postoperative Care  Postoperative pain management:  IV analgesics and Neuraxial analgesia.      Consents  Anesthetic plan, risks, benefits and alternatives discussed with:  Patient and Spouse..                 Aris Mascorro MD  "

## 2019-07-18 NOTE — ANESTHESIA POSTPROCEDURE EVALUATION
Patient: Charlotte Antonio    Procedure(s):  PRIMARY  SECTION    Diagnosis:PLACENTA PREVIA  Diagnosis Additional Information: No value filed.    Anesthesia Type:  Spinal    Note:  Anesthesia Post Evaluation    Patient location during evaluation: PACU  Patient participation: Able to fully participate in evaluation  Level of consciousness: awake and alert  Pain management: adequate  Airway patency: patent  Cardiovascular status: acceptable  Respiratory status: acceptable  Hydration status: acceptable  PONV: none     Anesthetic complications: None          Last vitals:  Vitals:    19 1105 19 1421 19 1430   BP: 109/65 100/48 105/67   Pulse: 92 89 82   Resp: 18 18 20   Temp: 37.3  C (99.1  F)  36.4  C (97.5  F)   SpO2:   98%         Electronically Signed By: Aris Mascorro MD  2019  2:43 PM

## 2019-07-19 LAB
HGB BLD-MCNC: 12 G/DL (ref 11.7–15.7)
T PALLIDUM AB SER QL: NONREACTIVE

## 2019-07-19 PROCEDURE — 36415 COLL VENOUS BLD VENIPUNCTURE: CPT | Performed by: OBSTETRICS & GYNECOLOGY

## 2019-07-19 PROCEDURE — 25000128 H RX IP 250 OP 636: Performed by: OBSTETRICS & GYNECOLOGY

## 2019-07-19 PROCEDURE — 85018 HEMOGLOBIN: CPT | Performed by: OBSTETRICS & GYNECOLOGY

## 2019-07-19 PROCEDURE — 12000035 ZZH R&B POSTPARTUM

## 2019-07-19 PROCEDURE — 25000132 ZZH RX MED GY IP 250 OP 250 PS 637: Performed by: OBSTETRICS & GYNECOLOGY

## 2019-07-19 RX ADMIN — OXYCODONE HYDROCHLORIDE 5 MG: 5 TABLET ORAL at 19:52

## 2019-07-19 RX ADMIN — ACETAMINOPHEN 975 MG: 325 TABLET, FILM COATED ORAL at 15:14

## 2019-07-19 RX ADMIN — OXYCODONE HYDROCHLORIDE 5 MG: 5 TABLET ORAL at 13:36

## 2019-07-19 RX ADMIN — IBUPROFEN 800 MG: 400 TABLET ORAL at 15:14

## 2019-07-19 RX ADMIN — SENNOSIDES AND DOCUSATE SODIUM 2 TABLET: 8.6; 5 TABLET ORAL at 19:52

## 2019-07-19 RX ADMIN — KETOROLAC TROMETHAMINE 30 MG: 30 INJECTION, SOLUTION INTRAMUSCULAR at 02:47

## 2019-07-19 RX ADMIN — ACETAMINOPHEN 975 MG: 325 TABLET, FILM COATED ORAL at 06:25

## 2019-07-19 RX ADMIN — ACETAMINOPHEN 975 MG: 325 TABLET, FILM COATED ORAL at 22:47

## 2019-07-19 RX ADMIN — OXYCODONE HYDROCHLORIDE 5 MG: 5 TABLET ORAL at 09:53

## 2019-07-19 RX ADMIN — KETOROLAC TROMETHAMINE 30 MG: 30 INJECTION, SOLUTION INTRAMUSCULAR at 08:30

## 2019-07-19 RX ADMIN — OXYCODONE HYDROCHLORIDE 5 MG: 5 TABLET ORAL at 16:03

## 2019-07-19 RX ADMIN — SENNOSIDES AND DOCUSATE SODIUM 1 TABLET: 8.6; 5 TABLET ORAL at 08:30

## 2019-07-19 RX ADMIN — IBUPROFEN 800 MG: 400 TABLET ORAL at 22:47

## 2019-07-19 RX ADMIN — LEVOTHYROXINE SODIUM 25 MCG: 25 TABLET ORAL at 07:14

## 2019-07-19 RX ADMIN — OXYCODONE HYDROCHLORIDE 5 MG: 5 TABLET ORAL at 22:48

## 2019-07-19 NOTE — PLAN OF CARE
Vital signs stable. Postpartum assessment WDL. Incision dressing intact, scant amount of moist serosanguinous drainage underneath. Pain controlled with tylenol, ibuprofen, and 5 mg of oxycodone. IV and catheter removed, needs one more measured urine output. Patient ambulating with assistance from one staff member and reports increased pain when ambulating. Using abdominal binder for comfort. Patient reports passing gas. Infant is sleepy at the breast, mostly attempts with one fair feed. Patient encouraged to be more assertive with breastfeeding as she tends to stop trying if she thinks baby is sleepy. Supplementing with small amount formula via feeding tube. Pumping after feedings. Patient and infant bonding well. Will continue with current plan of care.

## 2019-07-19 NOTE — PLAN OF CARE
At 2045, attempted to assist Pt up to bathroom but Pt c/o feeling dizziness when up.  Pt dangled at bedside for 5 minutes.  Assisted Pt back to bed.  Sera cares performed in bed.  Pain is under control with duramorph, tylenol, and toradol.  Pt tolerated clear liquid and toasts well.  Families are supportive at bedside.  Continues to monitor Pt.

## 2019-07-19 NOTE — OP NOTE
Procedure Date: 2019      PROCEDURE:  Primary low transverse  section.        PREOPERATIVE DIAGNOSIS:  Posterior marginal placenta previa.      POSTOPERATIVE DIAGNOSIS:  Posterior marginal placenta previa.      SURGEON:  Reyes Chacko MD      ASSISTANT:  Suad Villagomez MD      ANESTHESIA:  Spinal.      QUANTITATIVE BLOOD LOSS:  382 mL.      FINDINGS:  A 7 pound 1 ounce male infant in vertex presentation with Apgars 9 and 9 and a posterior marginal previa.      COMPLICATIONS:  None.      INDICATIONS:  The patient is an otherwise healthy 29-year-old primigravida who during the course of pregnancy has been diagnosed with a posterior marginal placenta previa.  After serial ultrasounds, there was no evidence of resolution of the previa; therefore, plans were made for  section.  She had no evidence of bleeding episodes during the course of her pregnancy.      DESCRIPTION OF PROCEDURE:  The patient was taken to the operating room where spinal anesthesia was placed and found to be adequate.  IV Ancef was administered.  Pneumatic compression devices were applied.  The catheter was anchored and she was prepped and draped in the usual sterile fashion.      A Pfannenstiel incision was made with a scalpel blade.  Bovie cautery was used to dissect through subcutaneous tissue and fascia.  The fascia was elevated off the rectus muscle walls with blunt dissection and Bovie cautery.  The midline was entered sharply.  The vesicouterine peritoneum was sharply dissected and downwardly displaced.      A low transverse cervical incision was made and entry into the amniotic cavity revealed clear fluid.  The fetus was in vertex presentation and fundal pressure was applied to allow for delivery through the incision.  Delayed cord clamping was performed.  The infant was handed to the nurse in attendance.      The posterior placenta was manually  from the uterus without difficulty.  The placenta was removed and  discarded.  There was no evidence of abnormal bleeding from the placental bed.  Pitocin was administered.  Good uterine tone was achieved.      The uterus was closed with a running stitch of 0 Monocryl suture.  A second imbricating layer was placed.  Hemostasis was achieved with 3 additional figure-of-eight stitches at different locations throughout the incision.  The adnexal structures were visualized and were unremarkable.  Hemostasis was confirmed with irrigation.      The fascia was closed with 0 PDS suture.  The subcutaneous tissues were irrigated and found to be hemostatic.  The skin was closed with Insorb staples and a Tegaderm dressing was applied.  All sponge, lap, needle and instrument counts were correct, and the patient was taken to the recovery room in stable condition.         CARMELINA KENNEDY MD             D: 2019   T: 2019   MT: RAYA      Name:     MARIO FREDERICK   MRN:      [id number]        Account:        [id number]   :      1990           Procedure Date: 2019      Document: [id number]

## 2019-07-19 NOTE — PLAN OF CARE
VSS. FF, light flow with a few small clots. Midline U/U. Incision dressing intact, small amount of moist red drainage underneath. Sera care done, IV infusing D5 LR, great output. Second IV on right had removed. Up to the bathroom this AM, tolerated well. No dizziness or lightheadedness. Taking tylenol and toradol for pain, declined the need for oxy. Attempting to breastfeed infant every 3 hours. No latch obtained, infant sleepy. Skin to skin completed. All questions addressed. Will continue to monitor.

## 2019-07-20 PROCEDURE — 12000035 ZZH R&B POSTPARTUM

## 2019-07-20 PROCEDURE — 25000132 ZZH RX MED GY IP 250 OP 250 PS 637: Performed by: OBSTETRICS & GYNECOLOGY

## 2019-07-20 RX ADMIN — IBUPROFEN 800 MG: 400 TABLET ORAL at 05:37

## 2019-07-20 RX ADMIN — ACETAMINOPHEN 975 MG: 325 TABLET, FILM COATED ORAL at 13:39

## 2019-07-20 RX ADMIN — OXYCODONE HYDROCHLORIDE 5 MG: 5 TABLET ORAL at 18:40

## 2019-07-20 RX ADMIN — OXYCODONE HYDROCHLORIDE 5 MG: 5 TABLET ORAL at 02:12

## 2019-07-20 RX ADMIN — OXYCODONE HYDROCHLORIDE 10 MG: 5 TABLET ORAL at 08:38

## 2019-07-20 RX ADMIN — SIMETHICONE CHEW TAB 80 MG 80 MG: 80 TABLET ORAL at 19:46

## 2019-07-20 RX ADMIN — ACETAMINOPHEN 975 MG: 325 TABLET, FILM COATED ORAL at 22:27

## 2019-07-20 RX ADMIN — IBUPROFEN 800 MG: 400 TABLET ORAL at 19:33

## 2019-07-20 RX ADMIN — SENNOSIDES AND DOCUSATE SODIUM 2 TABLET: 8.6; 5 TABLET ORAL at 08:38

## 2019-07-20 RX ADMIN — LEVOTHYROXINE SODIUM 25 MCG: 25 TABLET ORAL at 07:11

## 2019-07-20 RX ADMIN — IBUPROFEN 800 MG: 400 TABLET ORAL at 13:39

## 2019-07-20 RX ADMIN — SENNOSIDES AND DOCUSATE SODIUM 2 TABLET: 8.6; 5 TABLET ORAL at 19:34

## 2019-07-20 RX ADMIN — OXYCODONE HYDROCHLORIDE 5 MG: 5 TABLET ORAL at 22:29

## 2019-07-20 RX ADMIN — ACETAMINOPHEN 975 MG: 325 TABLET, FILM COATED ORAL at 06:46

## 2019-07-20 RX ADMIN — OXYCODONE HYDROCHLORIDE 5 MG: 5 TABLET ORAL at 05:37

## 2019-07-20 NOTE — PLAN OF CARE
Vital signs are stable. Postpartum assessment WDL. Incision dressing intact, scant amount of moist serosanguinous drainage underneath. Pain controlled with tylenol, ibuprofen, and 5 mg of oxycodone.  Patient ambulating independently.  Patient reports passing gas. Patient and infant bonding well.  Needs full assistance with latch.  Will continue with current plan of care.

## 2019-07-20 NOTE — PLAN OF CARE
Patient's vital signs are stable.  She is tolerating diet, ambulating and caring for the baby independently.  She is moving slowly due to pain but is working on being independent.   is here and attentive.  Adequate pain control with oral pain medications.  Incision is clean, dry and intact.  Lochia is WNL.

## 2019-07-20 NOTE — PROGRESS NOTES
"POD2  Notes insicional discomfort and cramping, increased oxycodone to 2 tablets.  BP 98/59   Pulse 80   Temp 97.8  F (36.6  C) (Oral)   Resp 16   Ht 1.549 m (5' 1\")   Wt 82 kg (180 lb 12.4 oz)   LMP 10/23/2018 (Exact Date)   SpO2 99%   Breastfeeding? Unknown   BMI 34.16 kg/m     NAD  Abd Soft, ND  Inc; CDI    POD2 s/p LTCS  Routine postop care    Madison Chacko   "

## 2019-07-20 NOTE — LACTATION NOTE
Initial Lactation visit. Hand out given. Recommend unlimited, frequent breast feedings: At least 8 - 12 times every 24 hours. Avoid pacifiers and supplementation with formula unless medically indicated. Explained benefits of holding baby skin on skin to help promote better breastfeeding outcomes.     Charlotte states breastfeeding is improving. She is using a shield, which she states has been helpful to get infant to latch. Encouraged her to continue calling staff for latch checks and assist with feedings as needed. Charlotte appreciative of my visit. Will revisit as needed.     Catalina Gaytan RN IBCLC

## 2019-07-21 PROCEDURE — 25000132 ZZH RX MED GY IP 250 OP 250 PS 637: Performed by: OBSTETRICS & GYNECOLOGY

## 2019-07-21 PROCEDURE — 12000035 ZZH R&B POSTPARTUM

## 2019-07-21 RX ADMIN — ACETAMINOPHEN 650 MG: 325 TABLET, FILM COATED ORAL at 17:20

## 2019-07-21 RX ADMIN — IBUPROFEN 800 MG: 400 TABLET ORAL at 01:32

## 2019-07-21 RX ADMIN — IBUPROFEN 800 MG: 400 TABLET ORAL at 07:19

## 2019-07-21 RX ADMIN — ACETAMINOPHEN 650 MG: 325 TABLET, FILM COATED ORAL at 13:28

## 2019-07-21 RX ADMIN — OXYCODONE HYDROCHLORIDE 5 MG: 5 TABLET ORAL at 23:55

## 2019-07-21 RX ADMIN — IBUPROFEN 800 MG: 400 TABLET ORAL at 13:28

## 2019-07-21 RX ADMIN — OXYCODONE HYDROCHLORIDE 5 MG: 5 TABLET ORAL at 01:32

## 2019-07-21 RX ADMIN — LEVOTHYROXINE SODIUM 25 MCG: 25 TABLET ORAL at 06:28

## 2019-07-21 RX ADMIN — ACETAMINOPHEN 975 MG: 325 TABLET, FILM COATED ORAL at 06:28

## 2019-07-21 RX ADMIN — IBUPROFEN 800 MG: 400 TABLET ORAL at 19:34

## 2019-07-21 RX ADMIN — ACETAMINOPHEN 650 MG: 325 TABLET, FILM COATED ORAL at 22:06

## 2019-07-21 NOTE — LACTATION NOTE
This note was copied from a baby's chart.  Visited with family for follow up lactation visit. Infant breastfeeding at time of visit. Infant latched well with nipple shield on right breast. Mother reports infant feedings are improving, feels breasts are heavier. Multiple swallows heard, milk in shield at completion of feeding. Discussed feeding on demand, waiting no longer than 3 hours between feedings. Encouraged breast compression while breastfeeding to maximize milk transfer. Mother receptive to information. Infant has been very sleepy for feedings, but is improving per bedside RN. Await provider recommendation.

## 2019-07-21 NOTE — LACTATION NOTE
This note was copied from a baby's chart.  Visited with family again after provider visit. Reviewed feeding plan:    Breast feed on demand, waiting no longer than 3 hours from the start of one feeding to the start of another.  Offer 30 ml EBM or formula supplement while breastfeeding at least 3 times/ day.  Pump after breastfeeding to provide supplement. (May not be necessary to pump after every feeding)    Parents agree with feeding plan.

## 2019-07-21 NOTE — PLAN OF CARE
Vital signs stable. Postpartum assessment WDL. Incision DI. Pain controlled with PO meds. Patient ambulating independently. Patient reports passing gas. Breastfeeding on cue with minimal assist, feedings are improving per pt. Patient and infant bonding well. Will continue with current plan of care.

## 2019-07-21 NOTE — PROGRESS NOTES
"Yesterday had increased gas pains. Has had 2 BM today, feeling better. Baby has lost weight and still working on breast feeding.    /65   Pulse 60   Temp 97.5  F (36.4  C) (Oral)   Resp 16   Ht 1.549 m (5' 1\")   Wt 82 kg (180 lb 12.4 oz)   LMP 10/23/2018 (Exact Date)   SpO2 99%   Breastfeeding? Unknown   BMI 34.16 kg/m     NAD  Abd Soft, ND  Inc: CDI    A/P: POD 3 s/p LTCS  Routine care  Will keep until POD4    Madison Chacko   "

## 2019-07-21 NOTE — PLAN OF CARE
Vital signs are stable and patient is moving better than she was yesterday.  She is getting adequate pain relief with Tylenol and oxycodone.  Fundus is firm and midline.  Plan is for her to discharge tomorrow am.

## 2019-07-21 NOTE — PLAN OF CARE
VSS. Fundus firm and midline. Scant flow. Pain 5/10, taking tylenol, ibuprofen, and oxycodone. Tegaderm clean dry intact. Breastfeeding. Ambulating free of dizziness and lightheaded. Passing gas. Simethicone given for gas pain. Encouraged to call with needs, questions, or concerns. Will continue to monitor.

## 2019-07-22 VITALS
WEIGHT: 180.78 LBS | HEIGHT: 61 IN | OXYGEN SATURATION: 99 % | BODY MASS INDEX: 34.13 KG/M2 | RESPIRATION RATE: 16 BRPM | HEART RATE: 60 BPM | TEMPERATURE: 98.8 F | SYSTOLIC BLOOD PRESSURE: 100 MMHG | DIASTOLIC BLOOD PRESSURE: 61 MMHG

## 2019-07-22 PROCEDURE — 25000132 ZZH RX MED GY IP 250 OP 250 PS 637: Performed by: OBSTETRICS & GYNECOLOGY

## 2019-07-22 RX ORDER — AMOXICILLIN 250 MG
1 CAPSULE ORAL 2 TIMES DAILY PRN
Qty: 20 TABLET | Refills: 1 | Status: SHIPPED | OUTPATIENT
Start: 2019-07-22 | End: 2019-08-22

## 2019-07-22 RX ORDER — OXYCODONE HYDROCHLORIDE 5 MG/1
5-10 TABLET ORAL EVERY 4 HOURS PRN
Qty: 25 TABLET | Refills: 0 | Status: SHIPPED | OUTPATIENT
Start: 2019-07-22 | End: 2019-08-22

## 2019-07-22 RX ADMIN — LEVOTHYROXINE SODIUM 25 MCG: 25 TABLET ORAL at 07:10

## 2019-07-22 RX ADMIN — IBUPROFEN 800 MG: 400 TABLET ORAL at 10:20

## 2019-07-22 RX ADMIN — SENNOSIDES AND DOCUSATE SODIUM 1 TABLET: 8.6; 5 TABLET ORAL at 07:44

## 2019-07-22 RX ADMIN — OXYCODONE HYDROCHLORIDE 5 MG: 5 TABLET ORAL at 06:14

## 2019-07-22 RX ADMIN — OXYCODONE HYDROCHLORIDE 5 MG: 5 TABLET ORAL at 03:03

## 2019-07-22 RX ADMIN — ACETAMINOPHEN 650 MG: 325 TABLET, FILM COATED ORAL at 03:02

## 2019-07-22 RX ADMIN — IBUPROFEN 800 MG: 400 TABLET ORAL at 03:02

## 2019-07-22 RX ADMIN — ACETAMINOPHEN 650 MG: 325 TABLET, FILM COATED ORAL at 10:20

## 2019-07-22 NOTE — LACTATION NOTE
Routine visit. Charlotte is being discharged today and her baby will remain inpatient for phototherapy. She states breastfeeding is improving and her baby is feeding well every 3 hours. She's then pumping and bottle feeding 30 mls of similac to infant per peds orders. She opted to have baby bottle fed in the nursery overnight and refused to pump. She feels like her milk is coming in. Discussed need to breastfeed or pump at least 8x/day. She's using a shield and is wondering when to stop. Recommended she call for a latch check with next feeding for evaluation of shield use. Charlotte appreciative of my visit. Will revisit as needed.

## 2019-07-22 NOTE — PLAN OF CARE
VSS Pt using Ibuprofen, tylenol, and oxycodone for pain control. Up independently in the room. Patient's infant staying for phototherapy. Pt discharged. Prescription medication filled here at hospital. Breastfeeding infant with a nipple shield. Supplementing with similac via bottle at least 3 times a day. Also using electric breast pump. Pt chooses to fill out birth certificate tomorrow and hand it in.

## 2019-07-22 NOTE — PROGRESS NOTES
"July 22, 2019      SUBJECTIVE: No acute overnight events.  Pain adequately controlled.  +Lochia, mod.  Tolerating PO.  + Flatus, had BM yesterday.  Ambulating/urinating w/o difficulty.  Denies CP/palp/SOB/LH.    OBJECTIVE: /61   Pulse 60   Temp 98.8  F (37.1  C) (Oral)   Resp (P) 16   Ht 1.549 m (5' 1\")   Wt 82 kg (180 lb 12.4 oz)   LMP 10/23/2018 (Exact Date)   SpO2 99%   Breastfeeding? Unknown   BMI 34.16 kg/m    Gen: NAD, A&O x3  Abd: soft.  Incision C/D/I, no active bleeding.  No erythema, induration, or discharge, tegaderm dressing in placed  Ext: mild edema BL LEs, symmetric, no CT    Hemoglobin   Date Value Ref Range Status   07/19/2019 12.0 11.7 - 15.7 g/dL Final   07/18/2019 13.4 11.7 - 15.7 g/dL Final   ]    A/P: POD#4 s/p primary LTCS 2/2 marginal previa.  Doing well.  Afebrile, VSS.  - ibuprofen/oxycodone/tylenol PRN pain  - regular diet as tolerated  - breastfeeding  - encouraged ambulation  - routine post-op care  - discharge today, f/u in office in 6 weeks.  Postpartum wound care/restrictions reviewed.  Pt is boarding in hospital today (baby needs phototherapy).        ZULEIMA SALGADO MD      "

## 2019-07-22 NOTE — DISCHARGE INSTRUCTIONS
Postop  Birth Instructions    Activity       Do not lift more than 10 pounds for 6 weeks after surgery.  Ask family and friends for help when you need it.    No driving until you have stopped taking your pain medications (usually two weeks after surgery).    No heavy exercise or activity for 6 weeks.  Don't do anything that will put a strain on your surgery site.    Don't strain when using the toilet.  Your care team may prescribe a stool softener if you have problems with your bowel movements.     To care for your incision:       Keep the incision clean and dry.    Do not soak your incision in water. No swimming or hot tubs until it has fully healed. You may soak in the bathtub if the water level is below your incision.    Do not use peroxide, gel, cream, lotion, or ointment on your incision.    Adjust your clothes to avoid pressure on your surgery site (check the elastic in your underwear for example).     You may see a small amount of clear or pink drainage and this is normal.  Check with your health care provider:       If the drainage increases or has an odor.    If the incision reddens, you have swelling, or develop a rash.    If you have increased pain and the medicine we prescribed doesn't help.    If you have a fever above 100.4 F (38 C) with or without chills when placing thermometer under your tongue.   The area around your incision (surgery wound), will feel numb.  This is normal. The numbness should go away in less than a year.     Keep your hands clean:  Always wash your hands before touching your incision (surgery wound). This helps reduce your risk of infection. If your hands aren't dirty, you may use an alcohol hand-rub to clean your hands. Keep your nails clean and short.    Call your healthcare provider if you have any of these symptoms:       You soak a sanitary pad with blood within 1 hour, or you see blood clots larger than a golf ball.    Bleeding that lasts more than 6  weeks.    Vaginal discharge that smells bad.    Severe pain, cramping or tenderness in your lower belly area.    A need to urinate more frequently (use the toilet more often), more urgently (use the toilet very quickly), or it burns when you urinate.    Nausea and vomiting.    Redness, swelling or pain around a vein in your leg.    Problems breastfeeding or a red or painful area on your breast.    Chest pain and cough or are gasping for air.    Problems with coping with sadness, anxiety or depression. If you have concerns about hurting yourself or the baby, call your provider immediately.      You have questions or concerns after you return home.              You should be on pelvic rest with no heavy lifting >25 lb for 6 weeks.  Please call or return if you have fever >/= 100.4 degrees Farenheit (38.0 degrees Celsius), severe worsening abdominal pain not relieved by oral pain medications, heavy persistent vaginal bleeding, chest pain, palpitations, shortness of breath, dizziness, depressed mood, or for any other major concern.  You may use over the counter ibuprofen (400 mg every 4 hours) and tylenol (500 mg every 4 hours) as needed for pain.  You can alternate this with a narcotic pain medication for more severe pain as needed (oxycodone 5 mg every 4 hours as needed).  You may also use stool softener (Sennakot or Colace/Docusate 1-2 tabs per day) as needed for constipation.  These are safe with breastfeeding.    Please call the office to schedule a routine postpartum examination in 6 weeks, or sooner if instructed so by your physician.  If you had gestational diabetes during pregnancy, then you must also schedule a 2 hour glucose test during your postpartum examination.

## 2019-07-22 NOTE — PLAN OF CARE
VSS. Fundus firm and midline. Scant flow. Pain 5/10, taking tylenol and ibuprofen. Tegaderm intact, no drainage. Breastfeeding/ pumping. Ambulating free of dizziness and lightheaded. Voiding without difficulty. Encouraged to call with needs, questions, or concerns. Will continue to monitor.

## 2019-07-22 NOTE — PLAN OF CARE
VSS. Assessment WDL, incision CDI. Pain well controlled with tylenol, ibuprofen and oxycodone. Patient educated on importance of pumping to stimulate milk supply, refused to overnight. Requested RN to bottle feed baby in nursery, plans to resume breast feeding in AM. Continue to monitor.

## 2019-07-23 ENCOUNTER — LACTATION ENCOUNTER (OUTPATIENT)
Age: 29
End: 2019-07-23

## 2019-07-23 NOTE — LACTATION NOTE
This note was copied from a baby's chart.  Follow up visit.  Infant at breast with shield at time of visit.  Visible milk in shield but infant was sleepy.  Encouraged Charlotte to wake infant more.  Discussed feeding plan for home.  Charlotte has the feeding plan written out.  Breastfeeding with shield and then supplementing after with ebm and giving 30 ml formula 3 times a day.  Reinforced that she should continue pumping after feeding until she is pumping 30 ml and then to not use any formula once she is getting 30 ml.  Charlotte had no concerns today. She felt comfortable with the feeding plan.    Reviewed outpatient lactation resources for follow up when discharged and encouraged her to make an appointment later this week or early next week.     Hilda Manzanares  RN, IBCLC

## 2019-07-27 NOTE — ADDENDUM NOTE
Addendum  created 07/27/19 0651 by Aris Mascorro MD    Attestation recorded in Intraprocedure, Intraprocedure Attestations filed

## 2019-08-03 NOTE — DISCHARGE SUMMARY
Admit Date:     2019   Discharge Date:     2019      ADMISSION DIAGNOSES:   1.  Intrauterine pregnancy at 38 weeks and 3 days.   2.  Marginal placenta previa.   3.  Hypothyroidism.      DISCHARGE DIAGNOSES:   1.  Intrauterine pregnancy at 38 weeks and 3 days.   2.  Marginal placenta previa.   3.  Hypothyroidism.   4.  Status post primary low transverse  section.      SUMMARY OF HOSPITAL COURSE:  The patient is a 29-year-old G1, P0 who presented to Labor and Delivery at 38 weeks and 3 days for scheduled primary  due to known posterior marginal placenta previa found on ultrasound.  Of note, her prenatal care had been uncomplicated other than history of hypothyroidism, for which she has been on levothyroxine throughout pregnancy, posterior marginal previa that did not resolve in the third trimester, as well as a known posterior leiomyoma, stable in size throughout pregnancy.  Of note, she did receive betamethasone for fetal lung maturity, given on  to , in anticipation of early delivery secondary to her placenta previa.  The patient subsequently underwent a primary low transverse  section without any complications.  Please see dictated operative report for further details.  During the remainder of the patient's hospital course, she remained afebrile and her vital signs remained stable.  Her Laurent catheter was removed on postop day 1 after adequate urine output and she was able to urinate independently.  Her pain was initially well controlled with I.V. Toradol and she was successfully transitioned to oral pain medications only.  Her postop day #1, hemoglobin was found to be 12.  She was tolerating p.o. and ambulating without difficulty.  She did have a bowel movement prior to discharge.  On postop day #4, she was deemed stable for discharge.      DISCHARGE MEDICATIONS:  Please see MAR.      DISCHARGE CONDITION:  Stable.      DISCHARGE INSTRUCTIONS:  The patient was  instructed to follow up in the office in 6 weeks for routine postoperative and postpartum visit.  She is instructed to call or to return sooner if she should have fever greater than or equal to 100.4 degrees Fahrenheit, severe worsening abdominal pain, heavy persistent vaginal bleeding, chest pain, palpitations, shortness of breath or for any other major concern.         ZULEIMA SALGADO MD             D: 2019   T: 2019   MT: WILVER      Name:     MARIO FREDERICK   MRN:      [id number]        Account:        [id number]   :      1990           Admit Date:     2019                                  Discharge Date: 2019      Document: [id number]

## 2019-08-22 ENCOUNTER — OFFICE VISIT (OUTPATIENT)
Dept: FAMILY MEDICINE | Facility: CLINIC | Age: 29
End: 2019-08-22
Payer: COMMERCIAL

## 2019-08-22 VITALS
SYSTOLIC BLOOD PRESSURE: 109 MMHG | HEART RATE: 74 BPM | TEMPERATURE: 99 F | BODY MASS INDEX: 30.4 KG/M2 | OXYGEN SATURATION: 96 % | HEIGHT: 61 IN | WEIGHT: 161 LBS | DIASTOLIC BLOOD PRESSURE: 62 MMHG

## 2019-08-22 DIAGNOSIS — Z76.89 ENCOUNTER TO ESTABLISH CARE: Primary | ICD-10-CM

## 2019-08-22 DIAGNOSIS — E03.9 HYPOTHYROIDISM, UNSPECIFIED TYPE: ICD-10-CM

## 2019-08-22 DIAGNOSIS — E28.2 PCOS (POLYCYSTIC OVARIAN SYNDROME): ICD-10-CM

## 2019-08-22 PROCEDURE — 84443 ASSAY THYROID STIM HORMONE: CPT | Performed by: INTERNAL MEDICINE

## 2019-08-22 PROCEDURE — 99214 OFFICE O/P EST MOD 30 MIN: CPT | Performed by: INTERNAL MEDICINE

## 2019-08-22 PROCEDURE — 36415 COLL VENOUS BLD VENIPUNCTURE: CPT | Performed by: INTERNAL MEDICINE

## 2019-08-22 RX ORDER — OMEGA-3 FATTY ACIDS/FISH OIL 300-1000MG
200 CAPSULE ORAL EVERY 4 HOURS PRN
COMMUNITY

## 2019-08-22 RX ORDER — ACETAMINOPHEN 500 MG
500-1000 TABLET ORAL EVERY 6 HOURS PRN
COMMUNITY

## 2019-08-22 ASSESSMENT — MIFFLIN-ST. JEOR: SCORE: 1387.67

## 2019-08-22 NOTE — Clinical Note
Per patient last pap smear was approximately on 03/01/2019 with [facility] and results were normal.No hx of abnormal pap smears

## 2019-08-22 NOTE — PROGRESS NOTES
Subjective     Charlotte Antonio is a 29 year old female who presents to clinic today for the following health issues:    Patient arrived 11 minutes late to appointment with     HPI   New Patient/Transfer of Care    Recent pregnancy  Patient had a recent  on 2019  She had placenta previa  It was her first pregnancy  Following with [facility]   She is breast feeding and breast pumping  She does have minor mild vaginal bleeding  She is taking prenatal vitamins    Hypothyroidism  Currently on levothyroxine 25 mcg   Dosage was recently lowered from 50 mcg  Originally started thyroid hormone to aid with infertility  OBGYN was checking her thyroid levels regularly  It was last checked 3-6 months ago      Up to date with vaccines  Up to date with pap smear  Per patient last pap smear was in 2019 with [facility]  No hx of abnormal pap smears  She has a hx of PCOS    Patient Active Problem List   Diagnosis     Chondromalacia patellae of right knee     Hypothyroidism     PCOS (polycystic ovarian syndrome)     S/P primary low transverse      Past Surgical History:   Procedure Laterality Date      SECTION N/A 2019    Procedure: PRIMARY  SECTION;  Surgeon: Reyes Chacko MD;  Location: [facility] OR     NO HISTORY OF SURGERY         Social History     Tobacco Use     Smoking status: Never Smoker     Smokeless tobacco: Never Used   Substance Use Topics     Alcohol use: No     Alcohol/week: 0.0 oz     Family History   Problem Relation Age of Onset     Diabetes Mother      Family History Negative Father      C.A.D. No family hx of      Cancer No family hx of          Current Outpatient Medications   Medication Sig Dispense Refill     acetaminophen (TYLENOL) 500 MG tablet Take 500-1,000 mg by mouth every 6 hours as needed for mild pain       ibuprofen (ADVIL/MOTRIN) 200 MG capsule Take 200 mg by mouth every 4 hours as needed for fever       levothyroxine (SYNTHROID/LEVOTHROID)  "25 MCG tablet Take 25 mcg by mouth daily  3     Multiple Vitamins-Minerals (MULTIVITAMIN ADULT PO) Take by mouth daily       vitamin D3 (CHOLECALCIFEROL) 2000 units (50 mcg) tablet Take 1 tablet by mouth daily       Allergies   Allergen Reactions     Sulfamethoxazole Rash       Reviewed and updated as needed this visit by Provider         Review of Systems   ROS COMP: CONSTITUTIONAL: NEGATIVE for fever, chills, change in weight  INTEGUMENTARY/SKIN: NEGATIVE for worrisome rashes, moles or lesions  EYES: NEGATIVE for vision changes or irritation  ENT/MOUTH: NEGATIVE for ear, mouth and throat problems  RESP: NEGATIVE for significant cough or SOB  BREAST: NEGATIVE for masses, tenderness or discharge  CV: NEGATIVE for chest pain, palpitations or peripheral edema  GI: NEGATIVE for nausea, abdominal pain, heartburn, or change in bowel habits  : NEGATIVE for frequency, dysuria, or hematuria  MUSCULOSKELETAL: NEGATIVE for significant arthralgias or myalgia  NEURO: NEGATIVE for weakness, dizziness or paresthesias  ENDOCRINE: NEGATIVE for temperature intolerance, skin/hair changes  HEME: NEGATIVE for bleeding problems  PSYCHIATRIC: NEGATIVE for changes in mood or affect    This document serves as a record of the services and decisions personally performed and made by Felicitas Canseco MD. It was created on his behalf by Rachael Torres, a trained medical scribe. The creation of this document is based on the provider's statements to the medical scribe.  Rachael Torres August 22, 2019 4:26PM        Objective    /62 (BP Location: Right arm, Patient Position: Chair, Cuff Size: Adult Regular)   Pulse 74   Temp 99  F (37.2  C) (Tympanic)   Ht 1.549 m (5' 1\")   Wt 73 kg (161 lb)   LMP 10/23/2018 (Exact Date)   SpO2 96%   BMI 30.42 kg/m    Body mass index is 30.42 kg/m .     Physical Exam     GENERAL APPEARANCE: healthy, alert and no distress  EYES: Eyes grossly normal to inspection, PERRL and conjunctivae and sclerae " "normal  HENT: ear canals and TM's normal and nose and mouth without ulcers or lesions  NECK: no adenopathy  RESP: lungs clear to auscultation - no rales, rhonchi or wheezes  CV: regular rates and rhythm, normal S1 S2, no S3    Diagnostic Test Results:  Labs reviewed in Epic  No results found for this or any previous visit (from the past 24 hour(s)).        Assessment & Plan     Charlotte was seen today for establish care.    Diagnoses and all orders for this visit:    She is here to establish care.    Hypothyroidism, unspecified type  -     TSH with free T4 reflex  Currently on levothyroxine 25 mcg   Originally started thyroid hormone to aid with infertility  OBGYN was checking her thyroid levels regularly  It was last checked 3-6 months ago  Patient signed MARYLU  Checking thyroid levels today   Discussed screening for hashimoto's   Patient prefers to wait and check if OBGYN already screened for this    PCOS (polycystic ovarian syndrome)  Patient has history of this .    Up to date with pap smear  Per patient last pap smear was in 2019 with [facility]  No hx of abnormal pap smears      Patient Instructions   Labs today  We are checking your thyroid levels to ensure no med adjustments are needed  Follow up in 6 months  Seek sooner medical attention if there is any worsening of symptoms or problems.          BMI:   Estimated body mass index is 30.42 kg/m  as calculated from the following:    Height as of this encounter: 1.549 m (5' 1\").    Weight as of this encounter: 73 kg (161 lb).   Weight management plan: Discussed healthy diet and exercise guidelines    The information in this document, created by the medical scribe for me, accurately reflects the services I personally performed and the decisions made by me. I have reviewed and approved this document for accuracy prior to leaving the patient care area.  August 22, 2019 4:42 PM    Felicitas Canseco MD  [facility]      "

## 2019-08-22 NOTE — PATIENT INSTRUCTIONS
Labs today  We are checking your thyroid levels to ensure no med adjustments are needed  Follow up in 6 months  Seek sooner medical attention if there is any worsening of symptoms or problems.

## 2019-08-23 LAB — TSH SERPL DL<=0.005 MIU/L-ACNC: 2.21 MU/L (ref 0.4–4)

## 2019-08-24 DIAGNOSIS — E03.9 HYPOTHYROIDISM, UNSPECIFIED TYPE: Primary | ICD-10-CM

## 2019-08-24 RX ORDER — LEVOTHYROXINE SODIUM 25 UG/1
25 TABLET ORAL DAILY
Qty: 90 TABLET | Refills: 1 | Status: SHIPPED | OUTPATIENT
Start: 2019-08-24

## 2019-08-24 RX ORDER — LEVOTHYROXINE SODIUM 25 UG/1
25 TABLET ORAL DAILY
Qty: 90 TABLET | Refills: 0 | Status: SHIPPED | OUTPATIENT
Start: 2019-08-24 | End: 2019-08-24

## 2019-08-25 NOTE — RESULT ENCOUNTER NOTE
Oriana Porter,    This is to inform you regarding your test result.    TSH which is thyroid hormone is normal.  Stay on current dose of levothyroxine.  Recheck TSH in 6 months      Sincerely,      Dr.Nasima Ajith MD,FACP

## 2019-09-17 ENCOUNTER — DOCUMENTATION ONLY (OUTPATIENT)
Dept: CARE COORDINATION | Facility: CLINIC | Age: 29
End: 2019-09-17

## 2019-09-17 NOTE — PROGRESS NOTES
[facility] will be sharing updates with you on Maternal Child Health Referral requests for home care services.  This is for care coordination purposes and alert you to referral status.  We received the referral for  Charlotte Antonio; MRN [id number] and want to update you:    [facility] is unable to see patient for postpartum/  assessment and education due to patient insurance [facility] is not contracted with [facility] for this service.   Patient advised to contact their insurance provider to determine if service is covered through another homecare agency. Offered option of private pay nurse assessment and education for mom or baby at service rate of 150.00 per visit or 180.00 for both.  Provided call back information if private pay visit is requested.    Referral source IF STILL INPATIENT, ordering MD, and Primary Care Providers for mom and baby notified via [facility] ENCOUNTER OR CALL.         Sincerely [facility]  Shoaib Oliva  [phone]

## 2020-03-10 ENCOUNTER — HEALTH MAINTENANCE LETTER (OUTPATIENT)
Age: 30
End: 2020-03-10

## 2020-12-20 ENCOUNTER — HEALTH MAINTENANCE LETTER (OUTPATIENT)
Age: 30
End: 2020-12-20

## 2021-04-24 ENCOUNTER — HEALTH MAINTENANCE LETTER (OUTPATIENT)
Age: 31
End: 2021-04-24

## 2021-10-03 ENCOUNTER — HEALTH MAINTENANCE LETTER (OUTPATIENT)
Age: 31
End: 2021-10-03

## 2022-05-15 ENCOUNTER — HEALTH MAINTENANCE LETTER (OUTPATIENT)
Age: 32
End: 2022-05-15

## 2022-09-11 ENCOUNTER — HEALTH MAINTENANCE LETTER (OUTPATIENT)
Age: 32
End: 2022-09-11

## 2023-06-03 ENCOUNTER — HEALTH MAINTENANCE LETTER (OUTPATIENT)
Age: 33
End: 2023-06-03

## (undated) DEVICE — CATH TRAY FOLEY 16FR BARDEX W/DRAIN BAG STATLOCK 300316A

## (undated) DEVICE — ESU GROUND PAD UNIVERSAL W/O CORD

## (undated) DEVICE — PACK C-SECTION LF PL15OTA83B

## (undated) DEVICE — SU MONOCRYL 0 CTX 36" Y398H

## (undated) DEVICE — SOL NACL 0.9% IRRIG 1000ML BOTTLE 2F7124

## (undated) DEVICE — SOL WATER IRRIG 1000ML BOTTLE 2F7114

## (undated) DEVICE — GLOVE PROTEXIS W/NEU-THERA 6.0  2D73TE60

## (undated) DEVICE — LINEN C-SECTION 5415

## (undated) DEVICE — SU PDS II 0 CT 36" Z358T

## (undated) DEVICE — DRSG TEGADERM 4X10" 1627

## (undated) DEVICE — SUCTION CANISTER MEDIVAC LINER 3000ML W/LID 65651-530

## (undated) DEVICE — SU VICRYL 4-0 PS-2 18" UND J496H

## (undated) DEVICE — BLADE CLIPPER 4406

## (undated) DEVICE — PREP CHLORAPREP 26ML TINTED ORANGE  260815

## (undated) RX ORDER — HYDROMORPHONE HYDROCHLORIDE 1 MG/ML
INJECTION, SOLUTION INTRAMUSCULAR; INTRAVENOUS; SUBCUTANEOUS
Status: DISPENSED
Start: 2019-07-18

## (undated) RX ORDER — MORPHINE SULFATE 1 MG/ML
INJECTION, SOLUTION EPIDURAL; INTRATHECAL; INTRAVENOUS
Status: DISPENSED
Start: 2019-07-18